# Patient Record
Sex: FEMALE | Race: WHITE | NOT HISPANIC OR LATINO | Employment: OTHER
[De-identification: names, ages, dates, MRNs, and addresses within clinical notes are randomized per-mention and may not be internally consistent; named-entity substitution may affect disease eponyms.]

---

## 2018-02-28 ENCOUNTER — NEW PATIENT COMPREHENSIVE (OUTPATIENT)
Age: 68
End: 2018-02-28

## 2018-02-28 DIAGNOSIS — H25.9: ICD-10-CM

## 2018-02-28 DIAGNOSIS — H43.393: ICD-10-CM

## 2018-02-28 PROCEDURE — 92004 COMPRE OPH EXAM NEW PT 1/>: CPT

## 2018-02-28 PROCEDURE — 92015 DETERMINE REFRACTIVE STATE: CPT

## 2018-02-28 ASSESSMENT — KERATOMETRY
OS_K2POWER_DIOPTERS: 46.25
OS_K1POWER_DIOPTERS: 46.00
OD_K2POWER_DIOPTERS: 46.25
OD_K1POWER_DIOPTERS: 46.50
OD_AXISANGLE2_DEGREES: 069
OS_AXISANGLE2_DEGREES: 099

## 2018-02-28 ASSESSMENT — VISUAL ACUITY
OU_SC: 20/80
OD_CC: 20/25-2
OU_CC: J1
OD_CC: J1
OU_CC: 20/20-1
OS_CC: J2
OS_SC: 20/80
OS_CC: 20/20-2
OD_SC: 20/100

## 2018-02-28 ASSESSMENT — TONOMETRY
OS_IOP_MMHG: 16
OD_IOP_MMHG: 17

## 2020-02-21 ENCOUNTER — ESTABLISHED COMPREHENSIVE EXAM (OUTPATIENT)
Dept: URBAN - METROPOLITAN AREA CLINIC 40 | Facility: CLINIC | Age: 70
End: 2020-02-21

## 2020-02-21 DIAGNOSIS — H52.203: ICD-10-CM

## 2020-02-21 DIAGNOSIS — H43.393: ICD-10-CM

## 2020-02-21 DIAGNOSIS — H25.812: ICD-10-CM

## 2020-02-21 DIAGNOSIS — H52.03: ICD-10-CM

## 2020-02-21 DIAGNOSIS — H25.811: ICD-10-CM

## 2020-02-21 DIAGNOSIS — H35.62: ICD-10-CM

## 2020-02-21 PROCEDURE — 92014 COMPRE OPH EXAM EST PT 1/>: CPT

## 2020-02-21 PROCEDURE — 92250 FUNDUS PHOTOGRAPHY W/I&R: CPT

## 2020-02-21 PROCEDURE — 92015 DETERMINE REFRACTIVE STATE: CPT

## 2020-02-21 ASSESSMENT — KERATOMETRY
OS_K1POWER_DIOPTERS: 46.00
OD_K2POWER_DIOPTERS: 46.25
OS_AXISANGLE2_DEGREES: 099
OD_K1POWER_DIOPTERS: 46.50
OD_AXISANGLE2_DEGREES: 069
OS_K2POWER_DIOPTERS: 46.25

## 2020-02-21 ASSESSMENT — VISUAL ACUITY
OS_SC: <J10
OD_SC: <J10
OS_CC: 20/20
OD_CC: J1
OS_SC: 20/100+1
OD_SC: 20/200
OS_CC: J1
OD_CC: 20/25+2

## 2020-02-21 ASSESSMENT — TONOMETRY
OS_IOP_MMHG: 16
OD_IOP_MMHG: 16

## 2020-05-29 ENCOUNTER — DILATED FUNDUS EXAM (OUTPATIENT)
Dept: URBAN - METROPOLITAN AREA CLINIC 39 | Facility: CLINIC | Age: 70
End: 2020-05-29

## 2020-05-29 DIAGNOSIS — H35.62: ICD-10-CM

## 2020-05-29 PROCEDURE — 92014 COMPRE OPH EXAM EST PT 1/>: CPT

## 2020-05-29 ASSESSMENT — KERATOMETRY
OD_K2POWER_DIOPTERS: 46.25
OD_AXISANGLE2_DEGREES: 069
OS_AXISANGLE2_DEGREES: 099
OS_K1POWER_DIOPTERS: 46.00
OD_K1POWER_DIOPTERS: 46.50
OS_K2POWER_DIOPTERS: 46.25

## 2020-05-29 ASSESSMENT — TONOMETRY
OS_IOP_MMHG: 15
OD_IOP_MMHG: 15

## 2020-05-29 ASSESSMENT — VISUAL ACUITY
OD_CC: 20/25-2
OS_CC: 20/25+2

## 2021-06-03 ENCOUNTER — ESTABLISHED COMPREHENSIVE EXAM (OUTPATIENT)
Dept: URBAN - METROPOLITAN AREA CLINIC 39 | Facility: CLINIC | Age: 71
End: 2021-06-03

## 2021-06-03 DIAGNOSIS — H25.812: ICD-10-CM

## 2021-06-03 DIAGNOSIS — H52.203: ICD-10-CM

## 2021-06-03 DIAGNOSIS — H25.811: ICD-10-CM

## 2021-06-03 DIAGNOSIS — H43.393: ICD-10-CM

## 2021-06-03 DIAGNOSIS — H52.03: ICD-10-CM

## 2021-06-03 PROCEDURE — 92015 DETERMINE REFRACTIVE STATE: CPT

## 2021-06-03 PROCEDURE — 92014 COMPRE OPH EXAM EST PT 1/>: CPT

## 2021-06-03 PROCEDURE — 92499OP2 OPTOMAP RETINAL SCREENING BOTH EYES

## 2021-06-03 ASSESSMENT — VISUAL ACUITY
OD_SC: 20/200
OD_CC: J1+
OD_BAT: 20/50
OS_SC: J10
OD_SC: J10
OS_BAT: 20/50
OS_CC: 20/30
OS_SC: 20/100-1
OS_CC: J1+
OD_CC: 20/25-2

## 2021-06-03 ASSESSMENT — KERATOMETRY
OS_K1POWER_DIOPTERS: 46.00
OD_AXISANGLE2_DEGREES: 069
OD_K2POWER_DIOPTERS: 46.25
OS_AXISANGLE2_DEGREES: 099
OS_K2POWER_DIOPTERS: 46.25
OD_K1POWER_DIOPTERS: 46.50

## 2021-06-03 ASSESSMENT — TONOMETRY
OS_IOP_MMHG: 16
OD_IOP_MMHG: 16

## 2022-02-10 NOTE — PROCEDURE NOTE: CLINICAL
PROCEDURE NOTE: Punctal Plugs, Suhail Been ([id number], [id number]) #2 Bilateral Lower Lids. Diagnosis: Dry Eye Syndrome. Prior to treatment, the risks/benefits/alternatives were discussed. The patient wished to proceed with procedure. Temporary collagen plugs were inserted. Patient tolerated procedure well. There were no complications. Post procedure instructions given. Ricco Chi

## 2022-06-09 ENCOUNTER — PREPPED CHART (OUTPATIENT)
Dept: URBAN - METROPOLITAN AREA CLINIC 39 | Facility: CLINIC | Age: 72
End: 2022-06-09

## 2022-06-09 ASSESSMENT — KERATOMETRY
OS_K1POWER_DIOPTERS: 46.00
OS_AXISANGLE2_DEGREES: 099
OD_K2POWER_DIOPTERS: 46.25
OD_K1POWER_DIOPTERS: 46.50
OD_AXISANGLE2_DEGREES: 069
OS_K2POWER_DIOPTERS: 46.25

## 2025-07-15 ENCOUNTER — NEW PATIENT (OUTPATIENT)
Age: 75
End: 2025-07-15

## 2025-07-15 DIAGNOSIS — H43.393: ICD-10-CM

## 2025-07-15 DIAGNOSIS — H25.813: ICD-10-CM

## 2025-07-15 PROCEDURE — 92004 COMPRE OPH EXAM NEW PT 1/>: CPT

## 2025-07-15 PROCEDURE — 92015 DETERMINE REFRACTIVE STATE: CPT
